# Patient Record
Sex: MALE | Race: WHITE
[De-identification: names, ages, dates, MRNs, and addresses within clinical notes are randomized per-mention and may not be internally consistent; named-entity substitution may affect disease eponyms.]

---

## 2021-10-04 NOTE — US
EXAMINATION TYPE: US scrotum with doppler.  

 

DATE OF EXAM: 10/4/2021

 

TECHNIQUE: Grayscale and color Doppler Duplex imaging performed of the scrotum.

 

COMPARISON: NONE

 

CLINICAL HISTORY: 31-year-old male groin pain. Right sided pain 1 day

 

FINDINGS:

 

EXAM MEASUREMENTS:

 

TESTICLES:

Right Testicle:  4.2x2.4x4.1 cm

Left Testicle:  4.3x2.2x3.2 cm

 

EPIDIDYMIS HEAD:

Right Epididymis:  0.8 cm

Left Epididymis:  0.6 cm  

 

Doppler performed to assess for testicular vascularity; good bilateral color flow and waveforms are s
een.   There is no evidence of testicular torsion.

 

Presence of hydroceles:  Traced on both sides.

Presence of varicoceles:  Borderline right varicocele 

 

Sonographer notes: No obvious hernia in right groin. Lymph node seen in area of pain measuring 1.8x0.
5x1.8cm

 

 

 

IMPRESSION: 

1. A mildly enlarged right inguinal lymph node at the site of patient's pain. Likely reactive less po
stinflammatory. Clinical follow-up recommended. If any enlargement is noted, the area can be rescanne
d.

2. No sonographic evidence for testicular torsion or epididymoorchitis.

3. Trace hydroceles bilaterally. There may be early developing varicocele on the right.

## 2021-10-04 NOTE — ED
General Adult HPI





- General


Chief complaint: Urogenital


Stated complaint: Groin Pain & Sinus Issues


Time Seen by Provider: 10/04/21 10:43


Source: patient, RN notes reviewed


Mode of arrival: ambulatory


Limitations: no limitations





- History of Present Illness


Initial comments: 





Patient is a 31-year-old male that presents to emergency room complaining of ri

ght lower abdominal pain with radiation to his groin.  He notes the sensation 

feels like he has to urinate but doesn't have to.  He notes that he still has 

his appendix.  He denies any risky sexual, worse.  Otherwise a well-appearing 

31-year-old male.  He denied any alleviating or aggravating factors at this 

time.  He notes this been going on for the past day.  She denied any chest pain 

short of breath headache nausea vomiting diarrhea constipation fever fatigue 

chills.





- Related Data


                                Home Medications











 Medication  Instructions  Recorded  Confirmed


 


Fexofenadine/Pseudoephedrine 1 tab PO BID PRN 10/04/21 10/04/21





[Allegra-D 12 Hour Tablet]   











                                    Allergies











Allergy/AdvReac Type Severity Reaction Status Date / Time


 


No Known Allergies Allergy   Verified 10/04/21 14:11














Review of Systems


ROS Statement: 


Those systems with pertinent positive or pertinent negative responses have been 

documented in the HPI.





ROS Other: All systems not noted in ROS Statement are negative.





Past Medical History


Past Medical History: No Reported History


Additional Past Medical History / Comment(s): seasonal allergies


History of Any Multi-Drug Resistant Organisms: None Reported


Additional Past Surgical History / Comment(s): umbical hernia


Past Psychological History: No Psychological Hx Reported


Smoking Status: Current every day smoker


Past Alcohol Use History: Occasional


Past Drug Use History: None Reported





General Exam


Limitations: no limitations


General appearance: alert, in no apparent distress


Head exam: Present: atraumatic, normocephalic, normal inspection


Eye exam: Present: normal appearance, PERRL, EOMI.  Absent: scleral icterus, 

conjunctival injection, periorbital swelling


ENT exam: Present: normal exam, mucous membranes moist


Neck exam: Present: normal inspection


Respiratory exam: Present: normal lung sounds bilaterally.  Absent: respiratory 

distress, wheezes, rales, rhonchi, stridor


Cardiovascular Exam: Present: regular rate, normal rhythm, normal heart sounds. 

Absent: systolic murmur, diastolic murmur, rubs, gallop, clicks


GI/Abdominal exam: Present: soft, tenderness (Right lower quadrant with deep 

palpation), normal bowel sounds.  Absent: distended, guarding, rebound, rigid


  ** Expanded


GI/Abdominal exam: Absent: psoas sign, obturator sign, heel tap sign, Rovsing's 

sign


 exam: Present: normal inspection, circumcision.  Absent: testicular t

enderness, urethral discharge, scrotal swelling, vertical testicular lie


Extremities exam: Present: normal inspection, full ROM, normal capillary refill.

 Absent: tenderness, pedal edema, joint swelling, calf tenderness


Neurological exam: Present: alert, oriented X3


Psychiatric exam: Present: normal affect, normal mood


Skin exam: Present: warm, dry, intact, normal color.  Absent: rash





Course


                                   Vital Signs











  10/04/21 10/04/21





  10:33 13:26


 


Temperature 98.1 F 


 


Pulse Rate 80 82


 


Respiratory 18 20





Rate  


 


Blood Pressure 127/88 119/85


 


O2 Sat by Pulse 100 99





Oximetry  














Medical Decision Making





- Medical Decision Making





31-year-old male complaining of abdominal pain with radiation to his groin.


Labs, CT the abdomen and pelvis, 1 L normal saline, 4 mg of morphine, 4 mg of 

Zofran ordered.


Computed tomography scan negative for any acute process.


Labs unremarkable.


Ultrasound of the scrotum ordered.


Son did show a right inguinal lymph node most likely reactive.


Patient most likely expressing a groin strain.


Case discussed with Dr. Chadwick, patient discharge home.





- Lab Data


Result diagrams: 


                                 10/04/21 11:08





                                 10/04/21 11:08


                                   Lab Results











  10/04/21 10/04/21 10/04/21 Range/Units





  11:08 11:08 11:08 


 


WBC  6.0    (3.8-10.6)  k/uL


 


RBC  5.41    (4.30-5.90)  m/uL


 


Hgb  15.1    (13.0-17.5)  gm/dL


 


Hct  42.7    (39.0-53.0)  %


 


MCV  78.8 L    (80.0-100.0)  fL


 


MCH  27.9    (25.0-35.0)  pg


 


MCHC  35.3    (31.0-37.0)  g/dL


 


RDW  13.2    (11.5-15.5)  %


 


Plt Count  293    (150-450)  k/uL


 


MPV  7.3    


 


Neutrophils %  62    %


 


Lymphocytes %  23    %


 


Monocytes %  8    %


 


Eosinophils %  3    %


 


Basophils %  1    %


 


Neutrophils #  3.7    (1.3-7.7)  k/uL


 


Lymphocytes #  1.4    (1.0-4.8)  k/uL


 


Monocytes #  0.5    (0-1.0)  k/uL


 


Eosinophils #  0.2    (0-0.7)  k/uL


 


Basophils #  0.1    (0-0.2)  k/uL


 


Sodium    137  (137-145)  mmol/L


 


Potassium    4.5  (3.5-5.1)  mmol/L


 


Chloride    104  ()  mmol/L


 


Carbon Dioxide    24  (22-30)  mmol/L


 


Anion Gap    9  mmol/L


 


BUN    17  (9-20)  mg/dL


 


Creatinine    0.81  (0.66-1.25)  mg/dL


 


Est GFR (CKD-EPI)AfAm    >90  (>60 ml/min/1.73 sqM)  


 


Est GFR (CKD-EPI)NonAf    >90  (>60 ml/min/1.73 sqM)  


 


Glucose    94  (74-99)  mg/dL


 


Calcium    9.7  (8.4-10.2)  mg/dL


 


Total Bilirubin    0.5  (0.2-1.3)  mg/dL


 


AST    26  (17-59)  U/L


 


ALT    31  (4-49)  U/L


 


Alkaline Phosphatase    61  ()  U/L


 


Total Protein    7.4  (6.3-8.2)  g/dL


 


Albumin    4.4  (3.5-5.0)  g/dL


 


Amylase    56  ()  U/L


 


Lipase    68  ()  U/L


 


Urine Color   Light Yellow   


 


Urine Appearance   Clear   (Clear)  


 


Urine pH   6.5   (5.0-8.0)  


 


Ur Specific Gravity   1.019   (1.001-1.035)  


 


Urine Protein   Negative   (Negative)  


 


Urine Glucose (UA)   Negative   (Negative)  


 


Urine Ketones   Negative   (Negative)  


 


Urine Blood   Negative   (Negative)  


 


Urine Nitrite   Negative   (Negative)  


 


Urine Bilirubin   Negative   (Negative)  


 


Urine Urobilinogen   <2.0   (<2.0)  mg/dL


 


Ur Leukocyte Esterase   Negative   (Negative)  














- Radiology Data


Radiology results: report reviewed, image reviewed


Ultrasound of the scrotum: A mildly enlarged right inguinal lymph node at the 

right side of patient's pain.  Likely reactive less postinflammatory.  No 

sonographic evidence for testicular torsion or epididymal orchitis.  Trace 

hydrocele bilaterally there may be developing varicocele on the right.


CT of the abdomen and pelvis: No significant acute findings seen to account for 

patient's clinical symptoms.





Disposition


Clinical Impression: 


 Groin strain





Disposition: HOME SELF-CARE


Condition: Stable


Instructions (If sedation given, give patient instructions):  Groin Strain (ED)


Additional Instructions: 


Please return to the Emergency Department if symptoms worsen or any other 

concerns.


Follow-up with primary care 1-2 days.


Take, Motrin as day for pain.





Is patient prescribed a controlled substance at d/c from ED?: No


Referrals: 


None,Stated [Primary Care Provider] - 1-2 days


Time of Disposition: 14:24

## 2022-06-06 ENCOUNTER — HOSPITAL ENCOUNTER (OUTPATIENT)
Dept: HOSPITAL 47 - RADUSWWP | Age: 32
Discharge: HOME | End: 2022-06-06
Attending: EMERGENCY MEDICINE
Payer: COMMERCIAL

## 2022-06-06 ENCOUNTER — HOSPITAL ENCOUNTER (OUTPATIENT)
Dept: HOSPITAL 47 - RADXRMAIN | Age: 32
Discharge: HOME | End: 2022-06-06
Attending: EMERGENCY MEDICINE
Payer: COMMERCIAL

## 2022-06-06 DIAGNOSIS — N50.811: Primary | ICD-10-CM

## 2022-06-06 DIAGNOSIS — M25.551: ICD-10-CM

## 2022-06-06 DIAGNOSIS — N50.811: ICD-10-CM

## 2022-06-06 DIAGNOSIS — M12.851: Primary | ICD-10-CM

## 2022-06-06 PROCEDURE — 93975 VASCULAR STUDY: CPT

## 2022-06-06 PROCEDURE — 76870 US EXAM SCROTUM: CPT

## 2022-06-06 PROCEDURE — 73502 X-RAY EXAM HIP UNI 2-3 VIEWS: CPT

## 2022-06-06 NOTE — XR
EXAMINATION TYPE: XR Hip Complete RT

 

DATE OF EXAM: 6/6/2022

 

COMPARISON: NONE

 

HISTORY: Pain

 

TECHNIQUE: 2 views submitted

 

FINDINGS:

There is no evidence of erosive change or acute fracture. Mild concentric joint. Mild hypertrophic ch
kellie of the acetabulum.

 

 

IMPRESSION:

1. No evidence of acute fracture or dislocation. 

2. Mild arthropathy. Correlate for femoral acetabular impingement

## 2022-06-06 NOTE — US
EXAMINATION TYPE: US scrotum with doppler.  

 

DATE OF EXAM: 6/6/2022

 

COMPARISON: 10/4/2021

 

CLINICAL HISTORY: 31-year-old male N50.811 RIGHT TESTICULAR PAIN. 

 

TECHNIQUE: Grayscale and color Doppler Duplex imaging performed of the scrotum.

 

 FINDINGS:

 

EXAM MEASUREMENTS:

 

TESTICLES:

Right Testicle:  4.3 x 1.9 x 2.9  cm

Left Testicle:  4.1 x 2.4 x 3.3  cm

 

Doppler performed to assess for testicular vascularity; good bilateral color flow and waveforms are s
een.   There is no evidence of testicular torsion.

 

EPIDIDYMIS HEAD:

Right Epididymis:  .8 x .7 x 1.0 cm

Left Epididymis:  1.0 x .8 x 1.1  cm  

 

 

Presence of hydroceles:  no

Presence of varicoceles:  no

 

Additional targeted scanning foraminal region shows no discrete abnormality.

 

 

 

IMPRESSION: 

No sonographic evidence for testicular torsion or epididymoorchitis. No hydrocele or varicocele seen.
 Additional targeted scanning along the right groin area of pain shows no discrete abnormality.

## 2022-10-08 NOTE — CT
EXAMINATION TYPE: CT abdomen pelvis w con

 

DATE OF EXAM: 10/4/2021

 

COMPARISON: None.

 

HISTORY: Right sided groin pain for 7 hours

 

CT DLP: 1659 mGycm, Automated Exposure Control for Dose Reduction was Utilized.

 

CONTRAST: 

CT scan of the abdomen and pelvis is performed without oral but with IV Contrast, patient injected wi
th 100 ml mL of Isovue 300.

 

FINDINGS:

 

LUNG BASES:  No significant abnormality is appreciated.

 

LIVER/GB:   No significant abnormality is appreciated.

 

PANCREAS:  No significant abnormality is seen.

 

SPLEEN:  No significant abnormality is seen.

 

ADRENALS:  No significant abnormality is seen.

 

KIDNEYS:  No significant abnormality is seen.

 

BOWEL: Slightly suboptimal evaluation of the bowel without enteric contrast. No suspicious small or l
arge bowel dilatation. Appendix measures upper limits of normal in size from the base of cecum in the
 right upper pelvis without surrounding inflammatory change.

 

PROSTATE/SEMINAL VESICLES:  No gross abnormality seen.

 

LYMPH NODES:  No greater than 1cm abdominal, groin, or pelvic lymph nodes are appreciated.

 

OSSEOUS STRUCTURES:  No significant abnormality is seen.

 

OTHER:  No significant additional abnormality is seen.

 

IMPRESSION:  No significant acute finding is seen to account for patient's clinical symptoms.
2 weeks

## 2022-12-15 ENCOUNTER — HOSPITAL ENCOUNTER (OUTPATIENT)
Dept: HOSPITAL 47 - RADXRMAIN | Age: 32
Discharge: HOME | End: 2022-12-15
Attending: EMERGENCY MEDICINE
Payer: COMMERCIAL

## 2022-12-15 DIAGNOSIS — S76.011A: Primary | ICD-10-CM

## 2022-12-15 PROCEDURE — 73502 X-RAY EXAM HIP UNI 2-3 VIEWS: CPT

## 2022-12-15 NOTE — XR
EXAMINATION TYPE: XR Hip Complete RT

 

DATE OF EXAM: 12/15/2022

 

COMPARISON: NONE

 

HISTORY: Pain

 

TECHNIQUE: 2 views submitted

 

FINDINGS:

There is no evidence of erosive change or acute fracture. Hypertrophic spurring lateral margin acetab
ulum.

 

 

IMPRESSION:

1. No evidence of acute fracture or dislocation.

## 2023-06-06 ENCOUNTER — HOSPITAL ENCOUNTER (OUTPATIENT)
Dept: HOSPITAL 47 - RADCTMAIN | Age: 33
Discharge: HOME | End: 2023-06-06
Payer: COMMERCIAL

## 2023-06-06 DIAGNOSIS — J32.2: ICD-10-CM

## 2023-06-06 DIAGNOSIS — J32.0: Primary | ICD-10-CM

## 2023-06-06 DIAGNOSIS — J34.2: ICD-10-CM

## 2023-06-06 PROCEDURE — 70486 CT MAXILLOFACIAL W/O DYE: CPT

## 2023-06-06 NOTE — CT
EXAMINATION TYPE: CT sinus wo con

 

DATE OF EXAM: 6/6/2023

 

COMPARISON: pain

 

HISTORY: sinus congestion

 

CT DLP: 449 mGycm.  Automated Exposure Control for Dose Reduction was Utilized.

 

TECHNIQUE: CT scan of the sinuses is performed without contrast, axial images are obtained, coronal r
eformatted images are also reviewed.

 

FINDINGS: The  paranasal sinuses including the frontal, ethmoid, sphenoid, and maxillary sinuses comp
lete opacification of the right maxillary sinus with occlusion of the right ostiomeatal complex. Left
 maxillary sinus widely patent and ostiomeatal complex patent.

There is mild mucosal thickening of the ethmoid air cells. Sphenoid sinus and frontal sinus normal ae
ration. No air-fluid levels.

 

Visualized portion of mastoid air cells show no abnormal opacification.  The globes are intact bilate
rally.  Large aj bullosa. Septal deviation.

 

IMPRESSION:

1. Severe right maxillary sinusitis with complete opacification of the sinus and occlusion of the ost
iomeatal complex.

2. Mild chronic ethmoidal sinusitis.

3. Slight nasal septal deviation.

## 2024-10-28 ENCOUNTER — HOSPITAL ENCOUNTER (EMERGENCY)
Dept: HOSPITAL 47 - EC | Age: 34
Discharge: HOME | End: 2024-10-28
Payer: COMMERCIAL

## 2024-10-28 VITALS — HEART RATE: 80 BPM | DIASTOLIC BLOOD PRESSURE: 84 MMHG | TEMPERATURE: 98.1 F | SYSTOLIC BLOOD PRESSURE: 126 MMHG

## 2024-10-28 VITALS — RESPIRATION RATE: 18 BRPM

## 2024-10-28 PROCEDURE — 99283 EMERGENCY DEPT VISIT LOW MDM: CPT

## 2024-10-28 RX ADMIN — KETOTIFEN FUMARATE STA DROPS: 0.35 SOLUTION/ DROPS OPHTHALMIC at 10:08

## 2024-10-28 RX ADMIN — TOBRAMYCIN STA DROPS: 3 SOLUTION OPHTHALMIC at 10:08

## 2024-10-28 NOTE — ED
Eye Problem HPI





- General


Chief complaint: Eye Problems


Stated complaint: eye swelling


Time Seen by Provider: 10/28/24 09:23


Source: patient, RN notes reviewed


Mode of arrival: ambulatory


Limitations: no limitations





- History of Present Illness


Initial comments: 


34-year-old male presents emergency department complaint of eye irritation.  

Patient states she has had some drainage from his left eye and now moved to the 

right eye.  Patient states he is on some Polytrim drops.  He states is not 

helping he states they are very itchy.  Patient was also placed on oral 

antibiotics and steroids but urgent care.  Disturbances no trauma.








- Related Data


                                Home Medications











 Medication  Instructions  Recorded  Confirmed


 


Fexofenadine/Pseudoephedrine 1 tab PO BID PRN 10/04/21 10/04/21





[Allegra-D 12 Hour Tablet]   











                                    Allergies











Allergy/AdvReac Type Severity Reaction Status Date / Time


 


No Known Allergies Allergy   Verified 10/28/24 09:17














Review of Systems


ROS Statement: 


Those systems with pertinent positive or pertinent negative responses have been 

documented in the HPI.





ROS Other: All systems not noted in ROS Statement are negative.





Past Medical History


Past Medical History: No Reported History


Additional Past Medical History / Comment(s): seasonal allergies


History of Any Multi-Drug Resistant Organisms: None Reported


Additional Past Surgical History / Comment(s): umbical hernia


Past Psychological History: No Psychological Hx Reported


Smoking Status: Current every day smoker


Past Alcohol Use History: Occasional


Past Drug Use History: None Reported





General Exam


Limitations: no limitations


General appearance: alert, in no apparent distress


Head exam: Present: atraumatic, normocephalic, normal inspection


Eye exam: Present: PERRL, EOMI, conjunctival injection (Bilateral).  Absent: 

normal appearance, scleral icterus, periorbital swelling, periorbital tenderness


ENT exam: Present: normal exam, mucous membranes moist


Neck exam: Present: normal inspection.  Absent: tenderness, meningismus, 

lymphadenopathy


Respiratory exam: Present: normal lung sounds bilaterally.  Absent: respiratory 

distress, wheezes, rales, rhonchi, stridor


Cardiovascular Exam: Present: regular rate, normal rhythm, normal heart sounds. 

Absent: systolic murmur, diastolic murmur, rubs, gallop, clicks


Neurological exam: Present: alert, oriented X3, CN II-XII intact


Skin exam: Present: warm, dry, intact, normal color.  Absent: rash





Course


                                   Vital Signs











  10/28/24





  09:17


 


Temperature 97.7 F


 


Pulse Rate 72


 


Respiratory 18





Rate 


 


Blood Pressure 140/93


 


O2 Sat by Pulse 97





Oximetry 














Medical Decision Making





- Medical Decision Making


Was pt. sent in by a medical professional or institution ([facility], NP, urgent 

care, hospital, or nursing home...) When possible be specific


@ -No


Did you speak to anyone other than the patient for history (EMS, parent, family,

police, friend...)? What history was obtained from this source 


@ -No


Did you review nursing and triage notes (agree or disagree)? Why? 


@ -I reviewed and agree with nursing and triage notes


Were old charts reviewed (outside hosp., previous admission, EMS record, old 

EKG, old radiological studies, urgent care reports/EKG's, nursing home records)?

Report findings 


@ -No old charts were reviewed


Differential Diagnosis (chest pain, altered mental status, abdominal pain women,

abdominal pain men, vaginal bleeding, weakness, fever, dyspnea, syncope, 

headache, dizziness, GI bleed, back pain, seizure, CVA, palpatations, mental 

health, musculoskeletal)? 


@ -Conjunctivitis, allergic, bacterial, viral, photokeratitis


EKG interpreted by me (3pts min.).


@ -None


X-rays interpreted by me (1pt min.).


@ -None done


CT interpreted by me (1pt min.).


@ -None done


U/S interpreted by me (1pt. min.).


@ -None done


What testing was considered but not performed or refused? (CT, X-rays, U/S, 

labs)? Why?


@ -None


What meds were considered but not given or refused? Why?


@ -None


Did you discuss the management of the patient with other professionals 

(professionals i.e. [facility], NP, lab, RT, psych nurse, , , 

teacher, , )? Give summary


@ -No


Was smoking cessation discussed for >3mins.?


@ -No


Was critical care preformed (if so, how long)?


@ -No


Were there social determinants of health that impacted care today? How? 

(Homelessness, low income, unemployed, alcoholism, drug addiction, 

transportation, low edu. Level, literacy, decrease access to med. care, prison, r

ehab)?


@ -No


Was there de-escalation of care discussed even if they declined (Discuss DNR or 

withdrawal of care, Hospice)? DNR status


@ -No


What co-morbidities impacted this encounter? (DM, HTN, Smoking, COPD, CAD, 

Cancer, CVA, ARF, Chemo, Hep., AIDS, mental health diagnosis, sleep apnea, 

morbid obesity)?


@ -None


Was patient admitted / discharged? Hospital course, mention meds given and 

route, prescriptions, significant lab abnormalities, going to OR and other 

pertinent info.


@ -Discharge patient has bilateral conjunctivitis which is because bacterial 

versus viral.  Patient will be discharged with Tobrex eyedrops, Zaditor for 

possible allergies.  Patient will follow-up with PCP tomorrow return parameters 

solange.


Undiagnosed new problem with uncertain prognosis?


@ -No


Drug Therapy requiring intensive monitoring for toxicity (Heparin, Nitro, 

Insulin, Cardizem)?


@ -No


Were any procedures done?


@ -No


Diagnosis/symptom?


@ -Conjunctivitis


Acute, or Chronic, or Acute on Chronic?


@ -Acute


Uncomplicated (without systemic symptoms) or Complicated (systemic symptoms)?


@ -uncomplicated


Side effects of treatment?


@ -No


Exacerbation, Progression, or Severe Exacerbation?


@ -No


Poses a threat to life or bodily function? How? (Chest pain, USA, MI, pneumonia,

PE, COPD, DKA, ARF, appy, cholecystitis, CVA, Diverticulitis, Homicidal, 

Suicidal, threat to staff... and all critical care pts)


@ -No








Disposition


Clinical Impression: 


 Conjunctivitis





Disposition: HOME SELF-CARE


Condition: Stable


Instructions (If sedation given, give patient instructions):  Conjunctivitis 

(ED)


Additional Instructions: 


Use Zaditor drops twice daily.  Use Tobrex drops 1 drop every 4 hours for 7 days

please return to the Emergency Department if symptoms worsen or any other 

concerns.


Is patient prescribed a controlled substance at d/c from ED?: No


Referrals: 


None,Stated [Primary Care Provider] - 1-2 days


Time of Disposition: 09:49